# Patient Record
Sex: FEMALE | Race: WHITE | HISPANIC OR LATINO | Employment: FULL TIME | URBAN - METROPOLITAN AREA
[De-identification: names, ages, dates, MRNs, and addresses within clinical notes are randomized per-mention and may not be internally consistent; named-entity substitution may affect disease eponyms.]

---

## 2017-02-23 ENCOUNTER — TELEPHONE (OUTPATIENT)
Dept: FAMILY MEDICINE | Facility: CLINIC | Age: 42
End: 2017-02-23

## 2017-02-23 ENCOUNTER — OFFICE VISIT (OUTPATIENT)
Dept: FAMILY MEDICINE | Facility: CLINIC | Age: 42
End: 2017-02-23
Payer: COMMERCIAL

## 2017-02-23 VITALS
BODY MASS INDEX: 24.29 KG/M2 | SYSTOLIC BLOOD PRESSURE: 117 MMHG | TEMPERATURE: 96.2 F | OXYGEN SATURATION: 100 % | WEIGHT: 132 LBS | HEART RATE: 68 BPM | HEIGHT: 62 IN | DIASTOLIC BLOOD PRESSURE: 72 MMHG

## 2017-02-23 DIAGNOSIS — R07.89 OTHER CHEST PAIN: Primary | ICD-10-CM

## 2017-02-23 PROCEDURE — 93000 ELECTROCARDIOGRAM COMPLETE: CPT | Performed by: PHYSICIAN ASSISTANT

## 2017-02-23 PROCEDURE — 99214 OFFICE O/P EST MOD 30 MIN: CPT | Performed by: PHYSICIAN ASSISTANT

## 2017-02-23 ASSESSMENT — PAIN SCALES - GENERAL: PAINLEVEL: MODERATE PAIN (4)

## 2017-02-23 NOTE — TELEPHONE ENCOUNTER
Paula Carney is a 41 year old female who calls with chest pain.     PRESENTING PROBLEM:  Chest pain    NURSING ASSESSMENT:  Patient complains of chest pain.  Onset:  Yesterday morning.  Located left chest   Duration constant.  Associated symptoms none.  Allergies:   Allergies   Allergen Reactions     Seasonal Allergies        NURSING PLAN: Nursing advice to patient Go to ER now for evaluation    RECOMMENDED DISPOSITION:  To ED now.  Will comply with recommendation: Yes  If further questions/concerns or if symptoms do not improve, worsen or new symptoms develop, call your PCP or [facility] as soon as possible.      Guideline used:  Telephone Triage Protocols for Nurses, Fifth Edition, Bertha Bone RN

## 2017-02-23 NOTE — NURSING NOTE
"Chief Complaint   Patient presents with     Chest Pain     started yesterday morning, consistent on upper left chest area.       Initial /72 (BP Location: Right arm, Patient Position: Chair, Cuff Size: Adult Regular)  Pulse 68  Temp 96.2  F (35.7  C)  Ht 5' 2\" (1.575 m)  Wt 132 lb (59.9 kg)  BMI 24.14 kg/m2 Estimated body mass index is 24.14 kg/(m^2) as calculated from the following:    Height as of this encounter: 5' 2\" (1.575 m).    Weight as of this encounter: 132 lb (59.9 kg).  Medication Reconciliation: complete  Yue Bone RN    "

## 2017-02-23 NOTE — TELEPHONE ENCOUNTER
Patient is calling with chest pains, stating not at the moment but have been having it all day and all night. In the middle of triage to RN, pt hung up. Please call. Thank you.

## 2017-02-23 NOTE — MR AVS SNAPSHOT
After Visit Summary   2/23/2017    Paula Carney    MRN: [id number]           Patient Information     Date Of Birth          1975        Visit Information        Provider Department      2/23/2017 12:50 PM Tc Knight PA-C [facility]        Today's Diagnoses     Chest pain    -  1      Care Instructions    ice or cold packs 20 minutes every 2-3 hrs as needed to relieve pain and swelling, for the first 2 days. Then can apply heat 20 minutes every 2-3 hrs (avoid sleeping on heating pad) there after as needed.   If able to tolerate non-steroidal anti-inflammatory medication like: Ibuprofen 600-800 mg three times daily or Aleve 200-400 mg twice daily.   Active range of motion exercises encouraged  Activity modification trying to avoid activities that cause you pain.   Follow up 2-4 wks as needed     Tc Knight PA-C                  Costochondritis (Chest Wall Pain)  Information About Your Condition:  Description  Costochondritis is inflammation of the joint between a rib and the breastbone (sternum) or between the bony part of the rib and the rib cartilage. Cartilage is a tough rubbery tissue that lines and cushions the surfaces of joints. The pain is most often on the left side of your chest, but can be on either side. Your healthcare provider might refer to costochondritis by other names, including chest wall pain, costosternal syndrome and costosternal chondrodynia. When the pain of costochondritis is accompanied by swelling it is called Tietze's syndrome. Costochondritis is more common in women than men. It tends to occur more often in people 12 to 14 years old or over 40 years old.   Symptoms  pain or tenderness to touch in the front of the chest near the breastbone   sharp pain when taking a deep breath, coughing, moving a certain way, or when pressing on it   trouble taking a deep breath   Causes  Sometimes costochondritis is caused by an injury to the chest,  such as falling or getting hit by something in the chest. It can also be caused by an infection, such as a cold or the flu. Many times the cause cannot be found.   What You Should Do At Home (Follow-up Care)   Avoid activities or movement that makes the pain worse.   Sometimes heat makes the pain better. A heating pad on the lowest setting can be put on the area for 20 minutes 4 to 8 times a day.   When the pain is gone, go back to your normal activities slowly.   Do gentle stretching exercises, but do not do vigorous exercise.   Acetaminophen (Tylenol ) or over-the-counter anti-inflammatory medicine such as ibuprofen (Motrin , Advil ) or naproxen (Aleve , Naprosyn ) may help decrease your pain. You should not take ibuprofen or naproxen if you have a history of bleeding in your stomach.   If you were given a prescription, be sure to get it filled right away. Take the medicine exactly as prescribed. If you do not think it is helping, call your healthcare provider. Do not increase how much you take or how often you take it without talking to your healthcare provider first.   If the healthcare provider prescribes pain medicine that makes you tired, or sleepy, or contains narcotics, you should not drink alcohol, including beer and wine, drive, or participate in any other activities that you need to be clear-headed for.   Please keep all medicines out of the reach of children.   What You Can Do Stay Healthy  Be sure to stretch and warm up properly before you start any strenuous exercise or activity.   Care Alerts  Call Your Healthcare Provider Right Away Or Return To The Emergency Department If:  You have a fever higher than 101.5  F (38.6  C) orally.   You start to have a cough with yellowish or greenish phlegm (mucus.)   There are streaks of blood in the phlegm you are coughing up.   You have any symptoms that worry you.             Follow-ups after your visit        Your next 10 appointments already scheduled     Feb  "2017 12:50 PM CST   [facility]    [address] [facility] [address] [phone]              Who to contact     If you have questions or need follow up information about today's clinic visit or your schedule please contact [facility] directly at [phone].  Normal or non-critical lab and imaging results will be communicated to you by CAPPTUREhart, letter or phone within 4 business days after the clinic has received the results. If you do not hear from us within 7 days, please contact the clinic through [facility] or phone. If you have a critical or abnormal lab result, we will notify you by phone as soon as possible.  Submit refill requests through Crunched or call your pharmacy and they will forward the refill request to us. Please allow 3 business days for your refill to be completed.          Additional Information About Your Visit        Crunched Information     Crunched lets you send messages to your doctor, view your test results, renew your prescriptions, schedule appointments and more. To sign up, go to [email or web address] . Click on \"Log in\" on the left side of the screen, which will take you to the Welcome page. Then click on \"Sign up Now\" on the right side of the page.     You will be asked to enter the access code listed below, as well as some personal information. Please follow the directions to create your username and password.     Your access code is: [email or web address]  Expires: 2017 12:41 PM     Your access code will  in 90 days. If you need help or a new code, please call your [facility] or [phone].        Care EveryWhere ID     This is your Care EveryWhere ID. This could be used by other organizations to access your [place] medical records  [id number]        Your Vitals Were     Pulse Temperature Height Pulse Oximetry BMI (Body Mass Index)       68 96.2  F (35.7  C) 5' 2\" " "(1.575 m) 100% 24.14 kg/m2        Blood Pressure from Last 3 Encounters:   02/23/17 117/72   09/02/16 106/68   07/18/16 90/68    Weight from Last 3 Encounters:   02/23/17 132 lb (59.9 kg)   09/02/16 130 lb (59 kg)   07/18/16 133 lb (60.3 kg)              We Performed the Following     EKG 12-lead complete w/read - Clinics        Primary Care Provider Office Phone # Fax #    Alida Martinez PA-C [phone]       [facility] [address] [facility] [address]        Thank you!     Thank you for choosing [facility]  for your care. Our goal is always to provide you with excellent care. Hearing back from our patients is one way we can continue to improve our services. Please take a few minutes to complete the written survey that you may receive in the mail after your visit with us. Thank you!             Your Updated Medication List - Protect others around you: Learn how to safely use, store and throw away your medicines at www.disposemymeds.org.          This list is accurate as of: 2/23/17 12:41 PM.  Always use your most recent med list.                   Brand Name Dispense Instructions for use    polyethylene glycol 400 0.25 % Soln ophthalmic solution    BLINK TEARS     Place 1 drop into both eyes 2 times daily \"Blink Tears\"         "

## 2017-02-23 NOTE — PROGRESS NOTES
SUBJECTIVE:                                                    Paula Carney is a 41 year old female who presents to clinic today for the following health issues:    Chest Pain      Onset: 3 days    Description (location/character/radiation/duration): left chest.     Intensity:  mild    Accompanying signs and symptoms:        Shortness of breath: no        Sweating: no        Nausea/vomitting: no        Palpitations: no        Other (fevers/chills/cough/heartburn/lightheadedness): no     History (similar episodes/previous evaluation): None    Precipitating or alleviating factors:       Worse with exertion: no        Worse with breathing: no        Related to eating: no        Better with burping: no     Therapies tried and outcome: None   She says it is painful when she pushes on chest and that reproduces her pain. Feels better with stretching arm back. No injury no new activities. No exertional symptoms. No numbness/tingling.  No history or family history of cardiac dz.     Problem list and histories reviewed & adjusted, as indicated.  Additional history: as documented    Patient Active Problem List   Diagnosis     CARDIOVASCULAR SCREENING; LDL GOAL LESS THAN 160     Ptosis, congenital, right     Chondromalacia patellae     Iliotibial band syndrome     Vitamin D deficiency     ASCUS with positive high risk HPV     Past Surgical History   Procedure Laterality Date     Mammoplasty augmentation         Social History   Substance Use Topics     Smoking status: Never Smoker     Smokeless tobacco: Never Used      Comment: Lives in smoke free household     Alcohol use No     Family History   Problem Relation Age of Onset     Asthma No family hx of      C.A.D. No family hx of      DIABETES No family hx of      Hypertension No family hx of      CEREBROVASCULAR DISEASE No family hx of      Breast Cancer No family hx of      Cancer - colorectal No family hx of      Anesthesia Reaction No family hx of      CANCER No  "family hx of      Thyroid Disease No family hx of      Glaucoma No family hx of      Macular Degeneration No family hx of          Current Outpatient Prescriptions   Medication Sig Dispense Refill     polyethylene glycol 400 (BLINK TEARS) 0.25 % SOLN ophthalmic solution Place 1 drop into both eyes 2 times daily \"Blink Tears\" (Patient not taking: Reported on 2/23/2017)       Allergies   Allergen Reactions     Seasonal Allergies      Problem list, Medication list, Allergies, and Medical/Social/Surgical histories reviewed in [facility] and updated as appropriate.    ROS:  Constitutional, HEENT, cardiovascular, pulmonary, gi and gu systems are negative, except as otherwise noted.    OBJECTIVE:                                                    /72 (BP Location: Right arm, Patient Position: Chair, Cuff Size: Adult Regular)  Pulse 68  Temp 96.2  F (35.7  C)  Ht 5' 2\" (1.575 m)  Wt 132 lb (59.9 kg)  SpO2 100%  BMI 24.14 kg/m2  Body mass index is 24.14 kg/(m^2).  GENERAL: healthy, alert and no distress  EYES: Eyes grossly normal to inspection, PERRL and conjunctivae and sclerae normal  HENT: ear canals and TM's normal, nose and mouth without ulcers or lesions  NECK: no adenopathy, no asymmetry, masses, or scars and thyroid normal to palpation  RESP: lungs clear to auscultation - no rales, rhonchi or wheezes  CV: regular rate and rhythm, normal S1 S2, no S3 or S4, no murmur, click or rub, no peripheral edema and peripheral pulses strong  Chest: point tender left supper middle rib region which reproduces her symptoms.   ABDOMEN: soft, nontender, no hepatosplenomegaly, no masses and bowel sounds normal  MS: no gross musculoskeletal defects noted, no edema  SKIN: no suspicious lesions or rashes  NEURO: Normal strength and tone, mentation intact and speech normal  PSYCH: mentation appears normal, affect normal/bright    Diagnostic Test Results:  EKG - NSR. None to compare     ASSESSMENT/PLAN:                                      "                   ICD-10-CM    1. Other chest pain R07.89 EKG 12-lead complete w/read - Clinics   See Patient Instructions  Patient reassurance that symptoms are musculoskeletal and expected healing course.  warning signs discussed. side effects discussed   follow up  As needed     Tc Knight PA-C  [facility]

## 2017-02-23 NOTE — PATIENT INSTRUCTIONS
ice or cold packs 20 minutes every 2-3 hrs as needed to relieve pain and swelling, for the first 2 days. Then can apply heat 20 minutes every 2-3 hrs (avoid sleeping on heating pad) there after as needed.   If able to tolerate non-steroidal anti-inflammatory medication like: Ibuprofen 600-800 mg three times daily or Aleve 200-400 mg twice daily.   Active range of motion exercises encouraged  Activity modification trying to avoid activities that cause you pain.   Follow up 2-4 wks as needed     Tc Knight PA-C                  Costochondritis (Chest Wall Pain)  Information About Your Condition:  Description  Costochondritis is inflammation of the joint between a rib and the breastbone (sternum) or between the bony part of the rib and the rib cartilage. Cartilage is a tough rubbery tissue that lines and cushions the surfaces of joints. The pain is most often on the left side of your chest, but can be on either side. Your healthcare provider might refer to costochondritis by other names, including chest wall pain, costosternal syndrome and costosternal chondrodynia. When the pain of costochondritis is accompanied by swelling it is called Tietze's syndrome. Costochondritis is more common in women than men. It tends to occur more often in people 12 to 14 years old or over 40 years old.   Symptoms  pain or tenderness to touch in the front of the chest near the breastbone   sharp pain when taking a deep breath, coughing, moving a certain way, or when pressing on it   trouble taking a deep breath   Causes  Sometimes costochondritis is caused by an injury to the chest, such as falling or getting hit by something in the chest. It can also be caused by an infection, such as a cold or the flu. Many times the cause cannot be found.   What You Should Do At Home (Follow-up Care)   Avoid activities or movement that makes the pain worse.   Sometimes heat makes the pain better. A heating pad on the lowest setting can be put on the  area for 20 minutes 4 to 8 times a day.   When the pain is gone, go back to your normal activities slowly.   Do gentle stretching exercises, but do not do vigorous exercise.   Acetaminophen (Tylenol ) or over-the-counter anti-inflammatory medicine such as ibuprofen (Motrin , Advil ) or naproxen (Aleve , Naprosyn ) may help decrease your pain. You should not take ibuprofen or naproxen if you have a history of bleeding in your stomach.   If you were given a prescription, be sure to get it filled right away. Take the medicine exactly as prescribed. If you do not think it is helping, call your healthcare provider. Do not increase how much you take or how often you take it without talking to your healthcare provider first.   If the healthcare provider prescribes pain medicine that makes you tired, or sleepy, or contains narcotics, you should not drink alcohol, including beer and wine, drive, or participate in any other activities that you need to be clear-headed for.   Please keep all medicines out of the reach of children.   What You Can Do Stay Healthy  Be sure to stretch and warm up properly before you start any strenuous exercise or activity.   Care Alerts  Call Your Healthcare Provider Right Away Or Return To The Emergency Department If:  You have a fever higher than 101.5  F (38.6  C) orally.   You start to have a cough with yellowish or greenish phlegm (mucus.)   There are streaks of blood in the phlegm you are coughing up.   You have any symptoms that worry you.

## 2017-04-26 ENCOUNTER — TELEPHONE (OUTPATIENT)
Dept: FAMILY MEDICINE | Facility: CLINIC | Age: 42
End: 2017-04-26

## 2017-04-26 NOTE — TELEPHONE ENCOUNTER
Patient is calling because she says she needs to schedule a Colposcopy. Please call to advise. Thank you

## 2017-05-15 NOTE — PROGRESS NOTES
SUBJECTIVE:                                                    Paula Carney is a 41 year old female who presents to clinic today for the following health issues:  Multiple health concerns:        Sore skin     Onset: last week not now    Description:   Location: Both legs  Character: painful, burning  Itching (Pruritis): no     Progression of Symptoms:  same    Accompanying Signs & Symptoms:  Fever: no   Body aches or joint pain: noSore throat symptoms: no   Recent cold symptoms: no    History:   Previous similar rash: no     Precipitating factors:   Exposure to similar rash: no   New exposures: None   Recent travel: no     Alleviating factors:  None     Therapies Tried and outcome: none    Last week her right thigh skin hurt, then left leg did the next day, went away quickly. Ibuprofen made it go away but then she had the same symptoms on right lower leg on a different day.   Got to the point where she was limping due to pain on her foot. Today it feels normal and she has no symptoms. Never had before. No numbness or tingling just felt sore.  Hurts to touch, felt some burning. No rashes. No low back pain.  No varicose veins. She admits she googled it and is worried about diabetes causing it. No edema or erythema. No history of diabetes.   H/o IT band syndrome and chondromalacia patella.       2)    irregular periods for a long time about 4 years, no period x 3 month now but she has gone 4 months without a periods before.  Also reports some mild dizziness off and on ( usually with working out and orthostatic/with burpees) and lack of appetite. But this got better with drinking fluids/more water.    no recent labs have been done on her.     has vasectomy so they dont need oral contraceptive pill.   Has had hormone testing 4 years ago, was normal per patient.     H/o abnormal pap:   Scheduled already  for colp this year with Dr. Angulo.   stephen in 2016 with high risk HPV.     3)   Upset stomach:    Eats about twice daily only---. Eats a lot of protein. Will try to eat smaller meals more frequently.   Only gets heartburn if she drinks coffee.  Doesn't usually feel that hungry per patient. No weight concerns per patient.   Does think she is lactose intolerant. Her symptoms are worse if she has creamer in her coffee.  No diarrhea.   Gets gassy with milk intake.  No vomiting.   H/o heartburn, she does not remember taking omeprazole but she has in the past.   No blood in stools or darker stools.     Admits she only Gets about 4 hours of sleep regularly--works a lot (2 jobs). Is tired from that. Is trying to get more sleep and not staying up so late.   Has been more stressed lately also.   Denies suicidal or homicidal thoughts.  Patient instructed to go to the emergency room or call 911 if these occur.    Nonsmoker.     Not sure if FH of thyroid issues. She would like her hormones and iron levels checked today. She does take iron over-the-counter daily.        Problem list and histories reviewed & adjusted, as indicated.  Additional history: as documented    Patient Active Problem List   Diagnosis     CARDIOVASCULAR SCREENING; LDL GOAL LESS THAN 160     Ptosis, congenital, right     Chondromalacia patellae     Iliotibial band syndrome     Vitamin D deficiency     ASCUS with positive high risk HPV     Past Surgical History:   Procedure Laterality Date     MAMMOPLASTY AUGMENTATION         Social History   Substance Use Topics     Smoking status: Never Smoker     Smokeless tobacco: Never Used      Comment: Lives in smoke free household     Alcohol use No     Family History   Problem Relation Age of Onset     Asthma No family hx of      C.A.D. No family hx of      DIABETES No family hx of      Hypertension No family hx of      CEREBROVASCULAR DISEASE No family hx of      Breast Cancer No family hx of      Cancer - colorectal No family hx of      Anesthesia Reaction No family hx of      CANCER No family hx of       "Thyroid Disease No family hx of      Glaucoma No family hx of      Macular Degeneration No family hx of          Current Outpatient Prescriptions   Medication Sig Dispense Refill     omeprazole (PRILOSEC) 40 MG capsule Take 1 capsule (40 mg) by mouth daily Take 30-60 minutes before a meal. 30 capsule 1     polyethylene glycol 400 (BLINK TEARS) 0.25 % SOLN ophthalmic solution Place 1 drop into both eyes 2 times daily \"Blink Tears\"       Allergies   Allergen Reactions     Seasonal Allergies        ROS:  Constitutional, HEENT, cardiovascular, pulmonary, GI, , musculoskeletal, neuro, skin, endocrine and psych systems are negative, except as otherwise noted.    OBJECTIVE:                                                    /76  Pulse 66  Temp 97.7  F (36.5  C) (Oral)  Wt 134 lb (60.8 kg)  SpO2 100%  Breastfeeding? No  BMI 24.51 kg/m2  Body mass index is 24.51 kg/(m^2).  GENERAL: healthy, alert and no distress  EYES: Eyes grossly normal to inspection, PERRL and conjunctivae and sclerae normal  HENT: ear canals and TM's normal, nose and mouth without ulcers or lesions  NECK: no adenopathy, no asymmetry, masses, or scars and thyroid normal to palpation  RESP: lungs clear to auscultation - no rales, rhonchi or wheezes  CV: regular rate and rhythm, normal S1 S2, no S3 or S4, no murmur, click or rub, no peripheral edema and peripheral pulses strong  ABDOMEN: soft, nontender, no hepatosplenomegaly, no masses and bowel sounds normal  MS: no gross musculoskeletal defects noted, no edema  NEURO: Normal strength and tone, mentation intact and speech normal  PSYCH: mentation appears normal, affect normal/bright  Skin: NO rashes.   LEGS: NO EDEMA OR Varicose veins, no tenderness    Results for orders placed or performed in visit on 05/18/17 (from the past 24 hour(s))   CBC with platelets differential   Result Value Ref Range    WBC 5.9 4.0 - 11.0 10e9/L    RBC Count 4.95 3.8 - 5.2 10e12/L    Hemoglobin 13.4 11.7 - 15.7 " g/dL    Hematocrit 40.3 35.0 - 47.0 %    MCV 81 78 - 100 fl    MCH 27.1 26.5 - 33.0 pg    MCHC 33.3 31.5 - 36.5 g/dL    RDW 13.2 10.0 - 15.0 %    Platelet Count 245 150 - 450 10e9/L    Diff Method Automated Method     % Neutrophils 46.0 %    % Lymphocytes 45.8 %    % Monocytes 6.8 %    % Eosinophils 1.2 %    % Basophils 0.2 %    Absolute Neutrophil 2.7 1.6 - 8.3 10e9/L    Absolute Lymphocytes 2.7 0.8 - 5.3 10e9/L    Absolute Monocytes 0.4 0.0 - 1.3 10e9/L    Absolute Eosinophils 0.1 0.0 - 0.7 10e9/L    Absolute Basophils 0.0 0.0 - 0.2 10e9/L          ASSESSMENT/PLAN:                                                    ASSESSMENT / PLAN:  (N92.6) Irregular menstrual cycle  (primary encounter diagnosis)  Comment: for years, see below. Suspect related to anxiety/stress and she does exercise a lot, but could be early menopause or PCOS or something else  Plan: Follicle stimulating hormone, TSH with free T4         reflex, Estradiol, HCG quantitative pregnancy,         Progesterone, Testosterone Free and Total            (K30) Acid indigestion  Comment:   Plan: omeprazole (PRILOSEC) 40 MG capsule        See below    (R20.9) Disturbance of skin sensation  Comment:   Plan: Vitamin B12, CBC with platelets differential,         Comprehensive metabolic panel, Ferritin,         Folate, Iron and iron binding capacity          Does not sound concerning per history was brief, if all labs normal will monitor       (E55.9) Vitamin D deficiency  Comment:   Plan: Vitamin D Deficiency            Patient Instructions   I will follow up with labs and plan from there    Try to get more sleep if possible    Stay hydrated    Try omeprazole prescription for at least 2 weeks, if not helping your tummy symptoms let me know and we will consider an upper endoscopy    If needed we will have you see OBGYN for your periods but I will check labs first          Alida Martinez PA-C  [facility]

## 2017-05-18 ENCOUNTER — OFFICE VISIT (OUTPATIENT)
Dept: FAMILY MEDICINE | Facility: CLINIC | Age: 42
End: 2017-05-18
Payer: COMMERCIAL

## 2017-05-18 VITALS
SYSTOLIC BLOOD PRESSURE: 110 MMHG | BODY MASS INDEX: 24.51 KG/M2 | TEMPERATURE: 97.7 F | HEART RATE: 66 BPM | OXYGEN SATURATION: 100 % | WEIGHT: 134 LBS | DIASTOLIC BLOOD PRESSURE: 76 MMHG

## 2017-05-18 DIAGNOSIS — E55.9 VITAMIN D DEFICIENCY: ICD-10-CM

## 2017-05-18 DIAGNOSIS — K30 ACID INDIGESTION: ICD-10-CM

## 2017-05-18 DIAGNOSIS — R20.9 DISTURBANCE OF SKIN SENSATION: ICD-10-CM

## 2017-05-18 DIAGNOSIS — N92.6 IRREGULAR MENSTRUAL CYCLE: Primary | ICD-10-CM

## 2017-05-18 LAB
ALBUMIN SERPL-MCNC: 3.8 G/DL (ref 3.4–5)
ALP SERPL-CCNC: 99 U/L (ref 40–150)
ALT SERPL W P-5'-P-CCNC: 24 U/L (ref 0–50)
ANION GAP SERPL CALCULATED.3IONS-SCNC: 8 MMOL/L (ref 3–14)
AST SERPL W P-5'-P-CCNC: 12 U/L (ref 0–45)
B-HCG SERPL-ACNC: 1 IU/L (ref 0–5)
BASOPHILS # BLD AUTO: 0 10E9/L (ref 0–0.2)
BASOPHILS NFR BLD AUTO: 0.2 %
BILIRUB SERPL-MCNC: 0.2 MG/DL (ref 0.2–1.3)
BUN SERPL-MCNC: 13 MG/DL (ref 7–30)
CALCIUM SERPL-MCNC: 9.1 MG/DL (ref 8.5–10.1)
CHLORIDE SERPL-SCNC: 103 MMOL/L (ref 94–109)
CO2 SERPL-SCNC: 29 MMOL/L (ref 20–32)
CREAT SERPL-MCNC: 0.67 MG/DL (ref 0.52–1.04)
DIFFERENTIAL METHOD BLD: NORMAL
EOSINOPHIL # BLD AUTO: 0.1 10E9/L (ref 0–0.7)
EOSINOPHIL NFR BLD AUTO: 1.2 %
ERYTHROCYTE [DISTWIDTH] IN BLOOD BY AUTOMATED COUNT: 13.2 % (ref 10–15)
ESTRADIOL SERPL-MCNC: 79 PG/ML
FERRITIN SERPL-MCNC: 60 NG/ML (ref 12–150)
FOLATE SERPL-MCNC: 14.2 NG/ML
FSH SERPL-ACNC: 51.7 IU/L
GFR SERPL CREATININE-BSD FRML MDRD: NORMAL ML/MIN/1.7M2
GLUCOSE SERPL-MCNC: 92 MG/DL (ref 70–99)
HCT VFR BLD AUTO: 40.3 % (ref 35–47)
HGB BLD-MCNC: 13.4 G/DL (ref 11.7–15.7)
IRON SATN MFR SERPL: 23 % (ref 15–46)
IRON SERPL-MCNC: 73 UG/DL (ref 35–180)
LYMPHOCYTES # BLD AUTO: 2.7 10E9/L (ref 0.8–5.3)
LYMPHOCYTES NFR BLD AUTO: 45.8 %
MCH RBC QN AUTO: 27.1 PG (ref 26.5–33)
MCHC RBC AUTO-ENTMCNC: 33.3 G/DL (ref 31.5–36.5)
MCV RBC AUTO: 81 FL (ref 78–100)
MONOCYTES # BLD AUTO: 0.4 10E9/L (ref 0–1.3)
MONOCYTES NFR BLD AUTO: 6.8 %
NEUTROPHILS # BLD AUTO: 2.7 10E9/L (ref 1.6–8.3)
NEUTROPHILS NFR BLD AUTO: 46 %
PLATELET # BLD AUTO: 245 10E9/L (ref 150–450)
POTASSIUM SERPL-SCNC: 3.9 MMOL/L (ref 3.4–5.3)
PROGEST SERPL-MCNC: NORMAL NG/ML
PROT SERPL-MCNC: 7.6 G/DL (ref 6.8–8.8)
RBC # BLD AUTO: 4.95 10E12/L (ref 3.8–5.2)
SODIUM SERPL-SCNC: 140 MMOL/L (ref 133–144)
TIBC SERPL-MCNC: 311 UG/DL (ref 240–430)
TSH SERPL DL<=0.005 MIU/L-ACNC: 2.03 MU/L (ref 0.4–4)
VIT B12 SERPL-MCNC: 385 PG/ML (ref 193–986)
WBC # BLD AUTO: 5.9 10E9/L (ref 4–11)

## 2017-05-18 PROCEDURE — 83001 ASSAY OF GONADOTROPIN (FSH): CPT | Performed by: PHYSICIAN ASSISTANT

## 2017-05-18 PROCEDURE — 84702 CHORIONIC GONADOTROPIN TEST: CPT | Performed by: PHYSICIAN ASSISTANT

## 2017-05-18 PROCEDURE — 83540 ASSAY OF IRON: CPT | Performed by: PHYSICIAN ASSISTANT

## 2017-05-18 PROCEDURE — 82728 ASSAY OF FERRITIN: CPT | Performed by: PHYSICIAN ASSISTANT

## 2017-05-18 PROCEDURE — 82746 ASSAY OF FOLIC ACID SERUM: CPT | Performed by: PHYSICIAN ASSISTANT

## 2017-05-18 PROCEDURE — 83550 IRON BINDING TEST: CPT | Performed by: PHYSICIAN ASSISTANT

## 2017-05-18 PROCEDURE — 84403 ASSAY OF TOTAL TESTOSTERONE: CPT | Performed by: PHYSICIAN ASSISTANT

## 2017-05-18 PROCEDURE — 82607 VITAMIN B-12: CPT | Performed by: PHYSICIAN ASSISTANT

## 2017-05-18 PROCEDURE — 99214 OFFICE O/P EST MOD 30 MIN: CPT | Performed by: PHYSICIAN ASSISTANT

## 2017-05-18 PROCEDURE — 84144 ASSAY OF PROGESTERONE: CPT | Performed by: PHYSICIAN ASSISTANT

## 2017-05-18 PROCEDURE — 80050 GENERAL HEALTH PANEL: CPT | Performed by: PHYSICIAN ASSISTANT

## 2017-05-18 PROCEDURE — 84270 ASSAY OF SEX HORMONE GLOBUL: CPT | Performed by: PHYSICIAN ASSISTANT

## 2017-05-18 PROCEDURE — 82306 VITAMIN D 25 HYDROXY: CPT | Performed by: PHYSICIAN ASSISTANT

## 2017-05-18 PROCEDURE — 36415 COLL VENOUS BLD VENIPUNCTURE: CPT | Performed by: PHYSICIAN ASSISTANT

## 2017-05-18 PROCEDURE — 82670 ASSAY OF TOTAL ESTRADIOL: CPT | Performed by: PHYSICIAN ASSISTANT

## 2017-05-18 RX ORDER — OMEPRAZOLE 40 MG/1
40 CAPSULE, DELAYED RELEASE ORAL DAILY
Qty: 30 CAPSULE | Refills: 1 | Status: SHIPPED | OUTPATIENT
Start: 2017-05-18 | End: 2018-01-10

## 2017-05-18 NOTE — NURSING NOTE
"Chief Complaint   Patient presents with     Derm Problem     sore skin on legs area per pt x 4 days no known injury       Initial /76  Pulse 66  Temp 97.7  F (36.5  C) (Oral)  Wt 134 lb (60.8 kg)  SpO2 100%  Breastfeeding? No  BMI 24.51 kg/m2 Estimated body mass index is 24.51 kg/(m^2) as calculated from the following:    Height as of 2/23/17: 5' 2\" (1.575 m).    Weight as of this encounter: 134 lb (60.8 kg).  Medication Reconciliation: complete      Lashanda Pyle MA    "

## 2017-05-18 NOTE — MR AVS SNAPSHOT
After Visit Summary   5/18/2017    Paula Carney    MRN: [id number]           Patient Information     Date Of Birth          1975        Visit Information        Provider Department      5/18/2017 3:40 PM Alida Martinez PA-C [facility]        Today's Diagnoses     Irregular menstrual cycle    -  1    Acid indigestion        Disturbance of skin sensation        Vitamin D deficiency          Care Instructions    I will follow up with labs and plan from there    Try to get more sleep if possible    Stay hydrated    Try omeprazole prescription for at least 2 weeks, if not helping your tummy symptoms let me know and we will consider an upper endoscopy    If needed we will have you see OBGYN for your periods but I will check labs first            Follow-ups after your visit        Your next 10 appointments already scheduled     Jun 05, 2017  3:15 PM CDT   PROCEDURE with Sheela Angulo MD, [place] PROCEDURE ROOM 2   [facility]    [address] [address] [facility] [facility] [address] [phone]              Who to contact     If you have questions or need follow up information about today's clinic visit or your schedule please contact [facility] directly at [phone].  Normal or non-critical lab and imaging results will be communicated to you by MyChart, letter or phone within 4 business days after the clinic has received the results. If you do not hear from us within 7 days, please contact the clinic through MyChart or phone. If you have a critical or abnormal lab result, we will notify you by phone as soon as possible.  Submit refill requests through [employer] or call your pharmacy and they will forward the refill request to us. Please allow 3 business days for your refill to be completed.          Additional Information About Your Visit        Care EveryWhere ID     This is your Care EveryWhere ID. This could be  used by other organizations to access your [facility] medical records  [id number]        Your Vitals Were     Pulse Temperature Pulse Oximetry Breastfeeding? BMI (Body Mass Index)       66 97.7  F (36.5  C) (Oral) 100% No 24.51 kg/m2        Blood Pressure from Last 3 Encounters:   05/18/17 110/76   02/23/17 117/72   09/02/16 106/68    Weight from Last 3 Encounters:   05/18/17 134 lb (60.8 kg)   02/23/17 132 lb (59.9 kg)   09/02/16 130 lb (59 kg)              We Performed the Following     CBC with platelets differential     Comprehensive metabolic panel     Estradiol     Ferritin     Folate     Follicle stimulating hormone     HCG quantitative pregnancy     Iron and iron binding capacity     Progesterone     Testosterone Free and Total     TSH with free T4 reflex     Vitamin B12     Vitamin D Deficiency          Today's Medication Changes          These changes are accurate as of: 5/18/17  4:27 PM.  If you have any questions, ask your nurse or doctor.               Start taking these medicines.        Dose/Directions    omeprazole 40 MG capsule   Commonly known as:  priLOSEC   Used for:  Acid indigestion   Started by:  Alida Martinez PA-C        Dose:  40 mg   Take 1 capsule (40 mg) by mouth daily Take 30-60 minutes before a meal.   Quantity:  30 capsule   Refills:  1            Where to get your medicines      These medications were sent to [id number] [address]  [address] [address]    Hours:  24-hours Phone:  [phone]     omeprazole 40 MG capsule                Primary Care Provider Office Phone # Fax #    Alida Martinez PA-C [phone]       [facility] [address] [facility] [address]        Thank you!     Thank you for choosing [facility]  for your care. Our goal is always to provide you with excellent care. Hearing back from our patients  "is one way we can continue to improve our services. Please take a few minutes to complete the written survey that you may receive in the mail after your visit with us. Thank you!             Your Updated Medication List - Protect others around you: Learn how to safely use, store and throw away your medicines at www.disposemymeds.org.          This list is accurate as of: 5/18/17  4:27 PM.  Always use your most recent med list.                   Brand Name Dispense Instructions for use    omeprazole 40 MG capsule    priLOSEC    30 capsule    Take 1 capsule (40 mg) by mouth daily Take 30-60 minutes before a meal.       polyethylene glycol 400 0.25 % Soln ophthalmic solution    BLINK TEARS     Place 1 drop into both eyes 2 times daily \"Blink Tears\"         "

## 2017-05-18 NOTE — PATIENT INSTRUCTIONS
I will follow up with labs and plan from there    Try to get more sleep if possible    Stay hydrated    Try omeprazole prescription for at least 2 weeks, if not helping your tummy symptoms let me know and we will consider an upper endoscopy    If needed we will have you see OBGYN for your periods but I will check labs first

## 2017-05-19 LAB — DEPRECATED CALCIDIOL+CALCIFEROL SERPL-MC: 31 UG/L (ref 20–75)

## 2017-05-22 LAB
SHBG SERPL-SCNC: 96 NMOL/L (ref 30–135)
TESTOST FREE SERPL-MCNC: 0.1 NG/DL (ref 0.11–0.58)
TESTOST SERPL-MCNC: 15 NG/DL (ref 8–60)

## 2017-05-22 NOTE — PROGRESS NOTES
PLEASE CALL PATIENT: Dear Paula,      It was a pleasure to see you at your recent office visit.  Your test results are listed below. Labs normal. Nothing to explain irregular periods.  Please schedule with OBGYN to help address this further.  It could be stress related also.     Hormones are all normal. Vitamins normal. Iron normal.   Thyroid normal. White and red blood cell counts normal.  No anemia.  Sodium, potassium, kidney and liver function normal.  Blood sugar normal, no diabetes.            If you have any questions or concerns, please call the clinic at [phone].    Sincerely,  Alida Martinez PA-C

## 2017-05-23 ENCOUNTER — TELEPHONE (OUTPATIENT)
Dept: FAMILY MEDICINE | Facility: CLINIC | Age: 42
End: 2017-05-23

## 2017-05-23 NOTE — TELEPHONE ENCOUNTER
Left message on answering machine for patient to call back.  [phone]  Elizabeth CASTN, RN, CPN

## 2017-05-23 NOTE — TELEPHONE ENCOUNTER
Notes Recorded by Alida Martinez PA-C on 5/22/2017 at 5:16 PM  PLEASE CALL PATIENT: Dear Paula,      It was a pleasure to see you at your recent office visit.  Your test results are listed below. Labs normal. Nothing to explain irregular periods.  Please schedule with OBGYN to help address this further.  It could be stress related also.     Hormones are all normal. Vitamins normal. Iron normal.   Thyroid normal. White and red blood cell counts normal.  No anemia.  Sodium, potassium, kidney and liver function normal.  Blood sugar normal, no diabetes.            If you have any questions or concerns, please call the clinic at [phone].    Sincerely,  Alida Martinez PA-C

## 2017-05-23 NOTE — TELEPHONE ENCOUNTER
Patient informed of result note as below. Patient verbalized understanding..Elizabeth CASTN, RN, CPN

## 2017-05-24 ENCOUNTER — OFFICE VISIT (OUTPATIENT)
Dept: INTERNAL MEDICINE | Facility: CLINIC | Age: 42
End: 2017-05-24
Payer: COMMERCIAL

## 2017-05-24 VITALS
TEMPERATURE: 97.8 F | DIASTOLIC BLOOD PRESSURE: 74 MMHG | OXYGEN SATURATION: 99 % | SYSTOLIC BLOOD PRESSURE: 107 MMHG | WEIGHT: 137.6 LBS | BODY MASS INDEX: 25.17 KG/M2 | HEART RATE: 69 BPM

## 2017-05-24 DIAGNOSIS — R10.12 LUQ ABDOMINAL PAIN: Primary | ICD-10-CM

## 2017-05-24 PROCEDURE — 99213 OFFICE O/P EST LOW 20 MIN: CPT | Performed by: INTERNAL MEDICINE

## 2017-05-24 NOTE — PROGRESS NOTES
SUBJECTIVE:                                                    Paula Carney is a 41 year old female who presents to clinic today for the following health issues:      Left sided abdominal pain/flank pain        Duration: 2 weeks-especially bad today. The pain is sharp, and lasts for hours at time. She had it for 3 hours today.    Description (location/character/radiation): Left side. Comes and goes.    Intensity:  8/10 when breathing or movement. 6/10 when not moving    Accompanying signs and symptoms: a little nausea. Urine has bad odor.    History (similar episodes/previous evaluation): None    Precipitating or alleviating factors: stretch to one side. Push on it and hold it relieves pain    Therapies tried and outcome: None     No noted injury to that area.   No dysuria or urinary frequency or urgency.   No diarrhea.  No constipation-it is formed, of normal consistency, not dark, no blood in the stools.  No recent changes to her diet, though on further introspection, she ate a banana this morning is wondering if this was the  trigger for her pain today.    Wt Readings from Last 4 Encounters:   05/24/17 137 lb 9.6 oz (62.4 kg)   05/18/17 134 lb (60.8 kg)   02/23/17 132 lb (59.9 kg)   09/02/16 130 lb (59 kg)       Problem list and histories reviewed & adjusted, as indicated.  Additional history: as documented    Patient Active Problem List   Diagnosis     CARDIOVASCULAR SCREENING; LDL GOAL LESS THAN 160     Ptosis, congenital, right     Chondromalacia patellae     Iliotibial band syndrome     Vitamin D deficiency     ASCUS with positive high risk HPV     Past Surgical History:   Procedure Laterality Date     MAMMOPLASTY AUGMENTATION         Social History   Substance Use Topics     Smoking status: Never Smoker     Smokeless tobacco: Never Used      Comment: Lives in smoke free household     Alcohol use No     Family History   Problem Relation Age of Onset     Asthma No family hx of      C.A.D. No family  "hx of      DIABETES No family hx of      Hypertension No family hx of      CEREBROVASCULAR DISEASE No family hx of      Breast Cancer No family hx of      Cancer - colorectal No family hx of      Anesthesia Reaction No family hx of      CANCER No family hx of      Thyroid Disease No family hx of      Glaucoma No family hx of      Macular Degeneration No family hx of          Current Outpatient Prescriptions   Medication Sig Dispense Refill     polyethylene glycol 400 (BLINK TEARS) 0.25 % SOLN ophthalmic solution Place 1 drop into both eyes 2 times daily \"Blink Tears\"       omeprazole (PRILOSEC) 40 MG capsule Take 1 capsule (40 mg) by mouth daily Take 30-60 minutes before a meal. (Patient not taking: Reported on 5/24/2017) 30 capsule 1       Reviewed and updated as needed this visit by clinical staff       Reviewed and updated as needed this visit by Provider           ==============================================================  ROS:  Constitutional, HEENT, cardiovascular, pulmonary, GI, , musculoskeletal, neuro, skin, endocrine and psych systems are negative, except as otherwise noted.      OBJECTIVE:                                                    /74 (BP Location: Right arm, Patient Position: Chair, Cuff Size: Adult Regular)  Pulse 69  Temp 97.8  F (36.6  C) (Oral)  Wt 137 lb 9.6 oz (62.4 kg)  SpO2 99%  BMI 25.17 kg/m2  Body mass index is 25.17 kg/(m^2).     GEN: not in acute distress  ABD:  there is a localized, ~3cm by 4cm area of tenderness to deep palpation under the left anterior rib cage, not worse with inspiration on exam; also mildly increased bowel sounds.  Ow soft NT, ND, no appreciated HSM.    ASSESSMENT/PLAN:                                                        ICD-10-CM    1. LUQ abdominal pain R10.12      ASSESSMENT: differential diagnosis: food intolerance vs superificial muscle strain  PLAN:  As per orders above and patient instructions below.     Patient Instructions   Please " try to take acetaminophen: Please take the Extra strength Tylenol or acetaminophen 500mg tablets as 1,000mg every 8 hours (whether you have pain or not at that time) for 1-2 weeks.    Try to use ice or heat packs to the area, whatever makes your pain better.    You can try to avoid bananas and coffee (as today's attack seemed to follow these symptoms).  You have indicated that you may be lactose-intolerant.  You can try to cut back on dairy foods, or try Lactaid.  You can also try keeping an abdominal pain dairy to see which foods and beverages seem to bring out your pain.  Please let us know if your abdominal pain is not any better after 1-2 weeks of the above treatment.               Adri Amado MD  [facility]

## 2017-05-24 NOTE — PATIENT INSTRUCTIONS
Please try to take acetaminophen: Please take the Extra strength Tylenol or acetaminophen 500mg tablets as 1,000mg every 8 hours (whether you have pain or not at that time) for 1-2 weeks.    Try to use ice or heat packs to the area, whatever makes your pain better.    You can try to avoid bananas and coffee (as today's attack seemed to follow these symptoms).  You have indicated that you may be lactose-intolerant.  You can try to cut back on dairy foods, or try Lactaid.  You can also try keeping an abdominal pain dairy to see which foods and beverages seem to bring out your pain.  Please let us know if your abdominal pain is not any better after 1-2 weeks of the above treatment.

## 2017-05-24 NOTE — MR AVS SNAPSHOT
After Visit Summary   5/24/2017    Paula Carney    MRN: [id number]           Patient Information     Date Of Birth          1975        Visit Information        Provider Department      5/24/2017 3:40 PM Adri Amado MD [facility]        Today's Diagnoses     LUQ abdominal pain    -  1      Care Instructions    Please try to take acetaminophen: Please take the Extra strength Tylenol or acetaminophen 500mg tablets as 1,000mg every 8 hours (whether you have pain or not at that time) for 1-2 weeks.    Try to use ice or heat packs to the area, whatever makes your pain better.    You can try to avoid bananas and coffee (as today's attack seemed to follow these symptoms).  You have indicated that you may be lactose-intolerant.  You can try to cut back on dairy foods, or try Lactaid.  You can also try keeping an abdominal pain dairy to see which foods and beverages seem to bring out your pain.  Please let us know if your abdominal pain is not any better after 1-2 weeks of the above treatment.          Follow-ups after your visit        Your next 10 appointments already scheduled     Jun 05, 2017  3:15 PM CDT   PROCEDURE with Sheela Angulo MD, [place] [facility]ED[facility] ROOM 2   [facility]    [address] [address] [phone]              Who to contact     If you have questions or need follow up information about today's clinic visit or your schedule please contact [facility] directly at [phone].  Normal or non-critical lab and imaging results will be communicated to you by MyChart, letter or phone within 4 business days after the clinic has received the results. If you do not hear from us within 7 days, please contact the clinic through MyChart or phone. If you have a critical or abnormal lab result, we will notify you by phone as soon as possible.  Submit refill requests through  "Nickie or call your pharmacy and they will forward the refill request to us. Please allow 3 business days for your refill to be completed.          Additional Information About Your Visit        Care EveryWhere ID     This is your Care EveryWhere ID. This could be used by other organizations to access your [place] medical records  [id number]        Your Vitals Were     Pulse Temperature Pulse Oximetry BMI (Body Mass Index)          69 97.8  F (36.6  C) (Oral) 99% 25.17 kg/m2         Blood Pressure from Last 3 Encounters:   05/24/17 107/74   05/18/17 110/76   02/23/17 117/72    Weight from Last 3 Encounters:   05/24/17 137 lb 9.6 oz (62.4 kg)   05/18/17 134 lb (60.8 kg)   02/23/17 132 lb (59.9 kg)              Today, you had the following     No orders found for display       Primary Care Provider Office Phone # Fax #    Alida Martinez PA-C [phone]       [facility] [address] [facility] [address]        Thank you!     Thank you for choosing [facility]  for your care. Our goal is always to provide you with excellent care. Hearing back from our patients is one way we can continue to improve our services. Please take a few minutes to complete the written survey that you may receive in the mail after your visit with us. Thank you!             Your Updated Medication List - Protect others around you: Learn how to safely use, store and throw away your medicines at www.disposemymeds.org.          This list is accurate as of: 5/24/17 11:59 PM.  Always use your most recent med list.                   Brand Name Dispense Instructions for use    omeprazole 40 MG capsule    priLOSEC    30 capsule    Take 1 capsule (40 mg) by mouth daily Take 30-60 minutes before a meal.       polyethylene glycol 400 0.25 % Soln ophthalmic solution    BLINK TEARS     Place 1 drop into both eyes 2 times daily \"Blink Tears\"         "

## 2017-05-24 NOTE — NURSING NOTE
"Chief Complaint   Patient presents with     Flank Pain     Left side pain X2 weeks       Initial /74 (BP Location: Right arm, Patient Position: Chair, Cuff Size: Adult Regular)  Pulse 69  Temp 97.8  F (36.6  C) (Oral)  Wt 137 lb 9.6 oz (62.4 kg)  SpO2 99%  BMI 25.17 kg/m2 Estimated body mass index is 25.17 kg/(m^2) as calculated from the following:    Height as of 2/23/17: 5' 2\" (1.575 m).    Weight as of this encounter: 137 lb 9.6 oz (62.4 kg).  Medication Reconciliation: complete    Gretchen Joseph CMA  "

## 2017-06-01 NOTE — PROGRESS NOTES
S:  Paula Carney is a 41 year old female here for a colposcopy.  Her last pap smear:      3/10/16: Pap - ASCUS, + HR HPV. Plan colp  4/29/16: [facility] ECC - negative. Plan cotest in 1 year.   No abnormal discharge or pain with sex    Cervical risk factors: Tobacco Use no      Previous STD none      Previous dysplasia: no      Previous colposcopy: yes:  date 4/2016.       Previous treatment:  none     Current birth control: other pull out  No LMP recorded. Patient is not currently having periods (Reason: Irregular Periods).  I discussed the history of HPV and the risk of dysplasia/cancer.      Pt does not need colposcopy today. Needs diagnostic pap.    PMH/PSH/Meds/All were reviewed and updated at this visit.      O:  Vitals noted.  Patient alert, oriented, and in no acute distress. She was placed on the exam table in the dorsal position and a speculum inserted into the vagina. The cervix was easily visualized.    GYN: cx appears normal, nulliparous, vagina pink/well rugated, normal physiologic white discharge in vault.      (R87.610) Papanicolaou smear of cervix with atypical squamous cells of undetermined significance (ASC-US)  (primary encounter diagnosis)  Comment: due for f/u  Plan: diagnostic pap collected, pt warned she may need another colp based on results.  Will notify when available    (N91.2) Absence of menstruation  Comment: irregular periods x 4 months  Plan: Beta HCG qual IFA urine        Neg HCG today.  Pt desires gyn consult about periods.

## 2017-06-05 ENCOUNTER — OFFICE VISIT (OUTPATIENT)
Dept: FAMILY MEDICINE | Facility: CLINIC | Age: 42
End: 2017-06-05
Payer: COMMERCIAL

## 2017-06-05 VITALS
SYSTOLIC BLOOD PRESSURE: 114 MMHG | TEMPERATURE: 97 F | DIASTOLIC BLOOD PRESSURE: 76 MMHG | WEIGHT: 139 LBS | HEIGHT: 62 IN | BODY MASS INDEX: 25.58 KG/M2 | HEART RATE: 61 BPM

## 2017-06-05 DIAGNOSIS — N91.2 ABSENCE OF MENSTRUATION: ICD-10-CM

## 2017-06-05 DIAGNOSIS — R87.610 PAPANICOLAOU SMEAR OF CERVIX WITH ATYPICAL SQUAMOUS CELLS OF UNDETERMINED SIGNIFICANCE (ASC-US): Primary | ICD-10-CM

## 2017-06-05 LAB — BETA HCG QUAL IFA URINE: NEGATIVE

## 2017-06-05 PROCEDURE — 88175 CYTOPATH C/V AUTO FLUID REDO: CPT | Performed by: FAMILY MEDICINE

## 2017-06-05 PROCEDURE — 84703 CHORIONIC GONADOTROPIN ASSAY: CPT | Performed by: FAMILY MEDICINE

## 2017-06-05 PROCEDURE — 87624 HPV HI-RISK TYP POOLED RSLT: CPT | Performed by: FAMILY MEDICINE

## 2017-06-05 PROCEDURE — 99213 OFFICE O/P EST LOW 20 MIN: CPT | Performed by: FAMILY MEDICINE

## 2017-06-05 NOTE — LETTER
"                                                                          Affirmation of Informed Consent for Surgery or Invasive Procedure    1.  I, (print patient's name) Paula Carney,  1975,   a.  Agree that I will have (include both the medical term and patient words):           Chief Complaint   Patient presents with     Colposcopy      b.  At [facility].     c.  The reason for this procedure is (medical condition):  HR HPV .   d.  This will be done or supervised by:  Sheela Angulo MD.   e.  My doctor may have help from others.  Help could include opening or closing         the wound. Help might also include taking grafts, cutting out tissue,                           implanting devices.  I have been told who will help, if known.     2.  I have talked to my doctor or health care team about:   a.  What the procedure is and what will happen.   b   How it may help me (the benefits).   c.  How it may harm me (the most likely and most serious risks).   d.  The long-term effects the procedure might have.    e.  My other choices for treatment.  The risks and benefits of those choices.    f.   What will likely happen if I say \"no\" to this procedure.    g.  How I might feel right after and how quickly I might be expected to recover.      h.  What medicines will be used to manage pain or sedate me.     3.  I agree that:  (If I do not agree with a statement, I have crossed it out and              initialed next to it.)       a.  I will ask questions.     b.  No one has promised me definite results.    c.  If serious problems are found during the procedure, the treatment may                    change.   d.  If I have \"do not resuscitate\" (DNR) wishes, I have discussed this with my                              doctor and they will be put on hold during the procedure.   e. Students and other appropriate people, approved by the facility, may watch                      the procedure and help with " tasks they are qualified for.                                                    f.  Pictures or videos may be taken. They may be used for medical or                  educational reasons only.       g. Tissues or organs removed from my body as part of the normal course of the                    procedure may be used for research or teaching purposes. They will be                  disposed of with respect.                    h.  If a staff person is exposed to my blood or body fluids, my blood will be drawn                   and tested for HIV and hepatitis.  I understand that by law, the test results will                    go:         -  In my medical record.                         -  To the Employee Occupational Health Service and/or Infection Control                                  at this facility.    -  To the [place] [facility] officials.                 i.  I may have a blood transfusion: I have talked to my doctor or care team about:    -  Why I may need a blood transfusion.     - The risks, benefits, and side effects of transfusion - and the risks of not        Having one.     -  Blood safety and other options for treatment.        Consent for blood transfusion obtained during a hospital admission is valid for the entire hospital stay.  Consent  for blood transfusion obtained in the clinic setting is valid for a year from the signature date.                                4.  I understand that:   a.  I can change my mind.  If I do, I must tell my doctor or team as soon as                           possible.              b.  The team members may change during the procedure.                c.   The team will double-check who I am.  They will ask me what I am having                         done and the site of the site of the procedure.  This is done for my safety.    My questions have been answered.  I agree to the procedure.  I have made my special needs and instructions known.      Paula Lomeli  Angelika      6/5/2017 3:04 PM  Patient (or representative)/Relationship to patient             Date  Time    For telephone consent:      6/5/2017  3:04 PM         Date  Time  Print name of patient (or representative)/relationship to patient    Witness:  I have verified that the signature is that of the patient or patient's representative and that this has been signed before the procedure:    NAME:        6/5/2017  3:04 PM         Date  Time  Person verifying patient's name or patient representative's signature     Provider:  I have discussed the procedure and the information stated above with the patient (or the patient's representative) and answered their questions. The patient or their representative consented to the procedure:      Sheela Angulo MD    6/5/2017  3:04 PM  Physician or Provider's Signature(s)   Date  Time       Intepreter (if used):       6/5/2017  3:04 PM                                   Name       Language/Organization Date  Time    Consent for procedure valid for 30 days after patient signature date     [facility]  AFFIRMATION OF INFORMED CONSENT FOR SURGERY OR INVASIVE PROCEDURE               Original - Medical Records

## 2017-06-05 NOTE — MR AVS SNAPSHOT
After Visit Summary   6/5/2017    Paula Carney    MRN: [id number]           Patient Information     Date Of Birth          1975        Visit Information        Provider Department      6/5/2017 3:15 PM Sheela Angulo MD; [place] PROCEDURE ROOM 2 [facility]        Today's Diagnoses     Papanicolaou smear of cervix with atypical squamous cells of undetermined significance (ASC-US)    -  1    Absence of menstruation           Follow-ups after your visit        Your next 10 appointments already scheduled     Jun 12, 2017  3:50 PM CDT   Office Visit with LADONNA Barnett [facility] ([facility])    [address] [address] [phone]           Bring a current list of meds and any records pertaining to this visit.  For Physicals, please bring immunization records and any forms needing to be filled out.  Please arrive 10 minutes early to complete paperwork.              Who to contact     If you have questions or need follow up information about today's clinic visit or your schedule please contact [facility] directly at [phone].  Normal or non-critical lab and imaging results will be communicated to you by MyChart, letter or phone within 4 business days after the clinic has received the results. If you do not hear from us within 7 days, please contact the clinic through MyChart or phone. If you have a critical or abnormal lab result, we will notify you by phone as soon as possible.  Submit refill requests through [employer] or call your pharmacy and they will forward the refill request to us. Please allow 3 business days for your refill to be completed.          Additional Information About Your Visit        Care EveryWhere ID     This is your Care EveryWhere ID. This could be used by other organizations to access your [place] medical records  [id number]        Your Vitals Were     Pulse  "Temperature Height BMI (Body Mass Index)          61 97  F (36.1  C) (Oral) 5' 2\" (1.575 m) 25.42 kg/m2         Blood Pressure from Last 3 Encounters:   06/05/17 114/76   05/24/17 107/74   05/18/17 110/76    Weight from Last 3 Encounters:   06/05/17 139 lb (63 kg)   05/24/17 137 lb 9.6 oz (62.4 kg)   05/18/17 134 lb (60.8 kg)              We Performed the Following     Beta HCG qual IFA urine     HPV High Risk Types DNA Cervical     Pap imaged thin layer diagnostic with HPV (select HPV order below)        Primary Care Provider Office Phone # Fax #    Alida Martinez PA-C [phone]       [facility] [address] [address]        Thank you!     Thank you for choosing [facility]  for your care. Our goal is always to provide you with excellent care. Hearing back from our patients is one way we can continue to improve our services. Please take a few minutes to complete the written survey that you may receive in the mail after your visit with us. Thank you!             Your Updated Medication List - Protect others around you: Learn how to safely use, store and throw away your medicines at www.disposemymeds.org.          This list is accurate as of: 6/5/17  4:17 PM.  Always use your most recent med list.                   Brand Name Dispense Instructions for use    omeprazole 40 MG capsule    priLOSEC    30 capsule    Take 1 capsule (40 mg) by mouth daily Take 30-60 minutes before a meal.       polyethylene glycol 400 0.25 % Soln ophthalmic solution    BLINK TEARS     Place 1 drop into both eyes 2 times daily \"Blink Tears\"         "

## 2017-06-05 NOTE — NURSING NOTE
"Chief Complaint   Patient presents with     Colposcopy       Initial /76  Pulse 61  Temp 97  F (36.1  C) (Oral)  Ht 5' 2\" (1.575 m)  Wt 139 lb (63 kg)  BMI 25.42 kg/m2 Estimated body mass index is 25.42 kg/(m^2) as calculated from the following:    Height as of this encounter: 5' 2\" (1.575 m).    Weight as of this encounter: 139 lb (63 kg).  Medication Reconciliation: complete  Jessica Brown , CMA     "

## 2017-06-07 ENCOUNTER — TELEPHONE (OUTPATIENT)
Dept: FAMILY MEDICINE | Facility: CLINIC | Age: 42
End: 2017-06-07

## 2017-06-07 LAB
COPATH REPORT: NORMAL
PAP: NORMAL

## 2017-06-07 NOTE — TELEPHONE ENCOUNTER
Patient is calling for her pap results from 6/5/17 with Sheela Angulo.  Please call and advise.  Thank you.  Swetha Corona,

## 2017-06-08 LAB
FINAL DIAGNOSIS: ABNORMAL
HPV HR 12 DNA CVX QL NAA+PROBE: POSITIVE
HPV16 DNA SPEC QL NAA+PROBE: NEGATIVE
HPV18 DNA SPEC QL NAA+PROBE: NEGATIVE
SPECIMEN DESCRIPTION: ABNORMAL

## 2017-06-09 ENCOUNTER — TELEPHONE (OUTPATIENT)
Dept: OBGYN | Facility: CLINIC | Age: 42
End: 2017-06-09

## 2017-06-09 NOTE — TELEPHONE ENCOUNTER
Spoke with patient and advised of pap results  Transferred to [facility] to schedule.   Yue Babin RN, BSN  Pap Tracking

## 2017-06-12 ENCOUNTER — OFFICE VISIT (OUTPATIENT)
Dept: OBGYN | Facility: CLINIC | Age: 42
End: 2017-06-12
Payer: COMMERCIAL

## 2017-06-12 VITALS
BODY MASS INDEX: 25.06 KG/M2 | OXYGEN SATURATION: 100 % | DIASTOLIC BLOOD PRESSURE: 74 MMHG | WEIGHT: 137 LBS | SYSTOLIC BLOOD PRESSURE: 120 MMHG | HEART RATE: 62 BPM

## 2017-06-12 DIAGNOSIS — N95.1 PERIMENOPAUSAL SYMPTOMS: Primary | ICD-10-CM

## 2017-06-12 PROCEDURE — 99203 OFFICE O/P NEW LOW 30 MIN: CPT | Performed by: NURSE PRACTITIONER

## 2017-06-12 RX ORDER — LEVONORGESTREL/ETHIN.ESTRADIOL 0.1-0.02MG
1 TABLET ORAL DAILY
Qty: 84 TABLET | Refills: 3 | Status: SHIPPED | OUTPATIENT
Start: 2017-06-12 | End: 2018-01-10

## 2017-06-12 NOTE — PROGRESS NOTES
S: Paula is a 41 year old  0 presenting today with concerns of irregular menstrual cycles for the last 3-5 years. This happened back in  when patient was extremely limiting her diet choices. Once she started adding more calories back in, cycles returned. Had been normal until the last few years. Still getting cycles every few months. LMP was . Has gone up to 4 or 5 months at the most without any bleeding. With cycles, day 1 is heavier and bleeding last 3 days total.  has had vasectomy. Hormone testing was done 1 month ago and showed an elevated FSH, normal Estradiol, TSH, and other hormonal labs. Patient denies any symptoms of hot flashes, night sweats, vaginal dryness. She is not comfortable with the thought of her cycles being unpredictable and has been caught with bleeding unexpectedly. She had been on oral contraceptive pills in the past for dysmenorrhea and questions restarting this for regulating cycles at this time. Patient believes her mother went through menopause well into her 50s. Patient medical, surgical, social, and family history reviewed and updated at today's visit. ROS: 10 point ROS neg other than the symptoms noted above in the HPI.    O: This is a well appearing female in no acute distress. Answers questions and maintains eye contact appropriately. Vital signs noted.  RESPIRATORY: Clear to auscultation bilaterally.  CV: Regular rate and rhythm without murmur, gallop, rub  ABDOMEN: Soft, nontender, nondistended, normoactive bowel sounds. No hepatosplenomegaly. No guarding, rebounding, or rigidity.  Vulva: No external lesions, normal hair distribution, no adenopathy  BUS:  Normal, no masses noted  Vagina: Moist, pink, no abnormal discharge, well rugated, no lesions  Cervix: Pink, nulliparous, midline. Without cervical motion tenderness.  Uterus: Normal size and shape, non-tender, mobile  Ovaries: No masses, non-tender, mobile    A/P:  (N95.1) Perimenopausal symptoms   (primary encounter diagnosis)  Comment: We reviewed her lab results, FSH suggests perimenopause. We discussed that symptoms of irregular cycles can occur even at this age and can last for years prior to being menopausal. We discussed hormonal changes we see as menopause nears and how that can impact ovulation. Patient would like to try a contraception as she prefers for cycles to be predictable at this time. Would like to try for 1-2 years and likely would then discontinue. We discussed benefits vs disadvantages of hormonal contraception. Patient tolerated low dose oral contraceptive pill well previously, would like to restart on that. We discussed when to start the pill, taking it at the same time every day, possible side effects she may experience.  Patient did also think she was having a colposcopy today. Discussed that unfortunately, I do not perform this procedure. She does prefer to see Dr. Angulo and so she will call to schedule this appointment.  Plan: levonorgestrel-ethinyl estradiol         (AVIANE,ALESSE,LESSINA) 0.1-20 MG-MCG per         tablet          Shavonne DOUGHERTY CNP

## 2017-06-12 NOTE — MR AVS SNAPSHOT
After Visit Summary   6/12/2017    Paula Carney    MRN: [id number]           Patient Information     Date Of Birth          1975        Visit Information        Provider Department      6/12/2017 3:50 PM Shavonne Cash APRN CNP [facility]        Today's Diagnoses     Perimenopausal symptoms    -  1       Follow-ups after your visit        Who to contact     If you have questions or need follow up information about today's clinic visit or your schedule please contact [facility] directly at [phone].  Normal or non-critical lab and imaging results will be communicated to you by MyChart, letter or phone within 4 business days after the clinic has received the results. If you do not hear from us within 7 days, please contact the clinic through MyChart or phone. If you have a critical or abnormal lab result, we will notify you by phone as soon as possible.  Submit refill requests through [employer] or call your pharmacy and they will forward the refill request to us. Please allow 3 business days for your refill to be completed.          Additional Information About Your Visit        Care EveryWhere ID     This is your Care EveryWhere ID. This could be used by other organizations to access your [place] medical records  [id number]        Your Vitals Were     Pulse Last Period Pulse Oximetry BMI (Body Mass Index)          62 03/28/2017 100% 25.06 kg/m2         Blood Pressure from Last 3 Encounters:   06/12/17 120/74   06/05/17 114/76   05/24/17 107/74    Weight from Last 3 Encounters:   06/12/17 137 lb (62.1 kg)   06/05/17 139 lb (63 kg)   05/24/17 137 lb 9.6 oz (62.4 kg)              Today, you had the following     No orders found for display         Today's Medication Changes          These changes are accurate as of: 6/12/17  3:55 PM.  If you have any questions, ask your nurse or doctor.               Start taking these medicines.         "Dose/Directions    levonorgestrel-ethinyl estradiol 0.1-20 MG-MCG per tablet   Commonly known as:  AVIANEFAYEELESSINA   Used for:  Perimenopausal symptoms   Started by:  Shavonne Cash APRN CNP        Dose:  1 tablet   Take 1 tablet by mouth daily   Quantity:  84 tablet   Refills:  3            Where to get your medicines      These medications were sent to [id number] [address]  [address] [address]    Hours:  24-hours Phone:  [phone]     levonorgestrel-ethinyl estradiol 0.1-20 MG-MCG per tablet                Primary Care Provider Office Phone # Fax #    Alida Martinez PA-C [phone]       [facility] [address] [address] [facility] [address]        Thank you!     Thank you for choosing [facility]  for your care. Our goal is always to provide you with excellent care. Hearing back from our patients is one way we can continue to improve our services. Please take a few minutes to complete the written survey that you may receive in the mail after your visit with us. Thank you!             Your Updated Medication List - Protect others around you: Learn how to safely use, store and throw away your medicines at www.disposemymeds.org.          This list is accurate as of: 6/12/17  3:55 PM.  Always use your most recent med list.                   Brand Name Dispense Instructions for use    levonorgestrel-ethinyl estradiol 0.1-20 MG-MCG per tablet    AVIANE,FAYEE,LESSINA    84 tablet    Take 1 tablet by mouth daily       omeprazole 40 MG capsule    priLOSEC    30 capsule    Take 1 capsule (40 mg) by mouth daily Take 30-60 minutes before a meal.       polyethylene glycol 400 0.25 % Soln ophthalmic solution    BLINK TEARS     Place 1 drop into both eyes 2 times daily \"Blink Tears\"         "

## 2017-06-12 NOTE — NURSING NOTE
"Chief Complaint   Patient presents with     Amenorrhea     missed period, on and off for a few years. menopause?        Initial /74  Pulse 62  Wt 137 lb (62.1 kg)  LMP 03/28/2017  SpO2 100%  BMI 25.06 kg/m2 Estimated body mass index is 25.06 kg/(m^2) as calculated from the following:    Height as of 6/5/17: 5' 2\" (1.575 m).    Weight as of this encounter: 137 lb (62.1 kg).  Medication Reconciliation: complete    Elsie Archibald MA    "

## 2017-06-16 NOTE — TELEPHONE ENCOUNTER
Patient had appointment with Shavonne on 6/12/17.    The following was discussed.    Patient did also think she was having a colposcopy today. Discussed that unfortunately, I do not perform this procedure. She does prefer to see Dr. Angulo and so she will call to schedule this appointment.      Chapincito Velez [facility]

## 2017-11-12 ENCOUNTER — HEALTH MAINTENANCE LETTER (OUTPATIENT)
Age: 42
End: 2017-11-12

## 2018-01-10 ENCOUNTER — OFFICE VISIT (OUTPATIENT)
Dept: OBGYN | Facility: CLINIC | Age: 43
End: 2018-01-10
Payer: COMMERCIAL

## 2018-01-10 VITALS
WEIGHT: 131.4 LBS | DIASTOLIC BLOOD PRESSURE: 79 MMHG | HEART RATE: 61 BPM | OXYGEN SATURATION: 96 % | BODY MASS INDEX: 24.03 KG/M2 | SYSTOLIC BLOOD PRESSURE: 119 MMHG

## 2018-01-10 DIAGNOSIS — R87.810 CERVICAL HIGH RISK HPV (HUMAN PAPILLOMAVIRUS) TEST POSITIVE: Primary | ICD-10-CM

## 2018-01-10 DIAGNOSIS — N89.8 VAGINAL DISCHARGE: ICD-10-CM

## 2018-01-10 DIAGNOSIS — Z32.00 PREGNANCY EXAMINATION OR TEST, PREGNANCY UNCONFIRMED: ICD-10-CM

## 2018-01-10 LAB
BETA HCG QUAL IFA URINE: NEGATIVE
SPECIMEN SOURCE: ABNORMAL
WET PREP SPEC: ABNORMAL

## 2018-01-10 PROCEDURE — 57454 BX/CURETT OF CERVIX W/SCOPE: CPT | Performed by: OBSTETRICS & GYNECOLOGY

## 2018-01-10 PROCEDURE — 88305 TISSUE EXAM BY PATHOLOGIST: CPT | Performed by: OBSTETRICS & GYNECOLOGY

## 2018-01-10 PROCEDURE — 84703 CHORIONIC GONADOTROPIN ASSAY: CPT | Performed by: OBSTETRICS & GYNECOLOGY

## 2018-01-10 PROCEDURE — 87210 SMEAR WET MOUNT SALINE/INK: CPT | Performed by: OBSTETRICS & GYNECOLOGY

## 2018-01-10 PROCEDURE — 99213 OFFICE O/P EST LOW 20 MIN: CPT | Mod: 25 | Performed by: OBSTETRICS & GYNECOLOGY

## 2018-01-10 NOTE — MR AVS SNAPSHOT
After Visit Summary   1/10/2018    Paula Carney    MRN: [id number]           Patient Information     Date Of Birth          1975        Visit Information        Provider Department      1/10/2018 3:45 PM Kary Barbour DO; PROC RM 1 [facility]        Today's Diagnoses     Pregnancy examination or test, pregnancy unconfirmed    -  1      Care Instructions                                                         If you have any questions regarding your visit, Please contact your care team.    [facility] CLINIC HOURS TELEPHONE NUMBER   Kary Barbour DO.    WILLIE Galaviz -    INGRID Steen RN       [facility], Wednesday[facility]  8:30a.m-5:00 p.m   [facility]  [address] [place] [address]  [phone] ask for [facility]    Imaging Smgmuhdese-[phone]       Urgent Care locations:    [facility] Saturday and Sunday   9 am - 5 pm    Monday-Friday   12 pm - 8 pm  Saturday and Sunday   9 am - 5 pm   [phone]     [facility] Labor and Delivery:  [phone]    If you need a medication refill, please contact your pharmacy. Please allow 3 business days for your refill to be completed.  As always, Thank you for trusting us with your healthcare needs!                Follow-ups after your visit        Who to contact     If you have questions or need follow up information about today's clinic visit or your schedule please contact [facility] directly at [phone].  Normal or non-critical lab and imaging results will be communicated to you by MyChart, letter or phone within 4 business days after the clinic has received the results. If you do not hear from us within 7 days, please contact the clinic through MyChart or phone. If you have a critical or abnormal lab result, we will notify you by phone as  "soon as possible.  Submit refill requests through [employer] or call your pharmacy and they will forward the refill request to us. Please allow 3 business days for your refill to be completed.          Additional Information About Your Visit        [employer] Information     [employer] lets you send messages to your doctor, view your test results, renew your prescriptions, schedule appointments and more. To sign up, go to [email or web address] . Click on \"Log in\" on the left side of the screen, which will take you to the Welcome page. Then click on \"Sign up Now\" on the right side of the page.     You will be asked to enter the access code listed below, as well as some personal information. Please follow the directions to create your username and password.     Your access code is: [email or web address]  Expires: 4/10/2018  4:11 PM     Your access code will  in 90 days. If you need help or a new code, please call your [place] clinic or [phone].        Care EveryWhere ID     This is your Care EveryWhere ID. This could be used by other organizations to access your [place] medical records  [id number]        Your Vitals Were     Pulse Last Period Pulse Oximetry Breastfeeding? BMI (Body Mass Index)       61 2017 96% No 24.03 kg/m2        Blood Pressure from Last 3 Encounters:   01/10/18 119/79   17 120/74   17 114/76    Weight from Last 3 Encounters:   01/10/18 59.6 kg (131 lb 6.4 oz)   17 62.1 kg (137 lb)   17 63 kg (139 lb)              We Performed the Following     Beta HCG qual IFA urine          Today's Medication Changes          These changes are accurate as of: 1/10/18  4:11 PM.  If you have any questions, ask your nurse or doctor.               Stop taking these medicines if you haven't already. Please contact your care team if you have questions.     levonorgestrel-ethinyl estradiol 0.1-20 MG-MCG per tablet   Commonly known as:  AVIANE,OTIS,LESSINA   Stopped by:  Kary Barbour " "DO Rico           omeprazole 40 MG capsule   Commonly known as:  priLOSEC   Stopped by:  Kary Barbour DO                    Primary Care Provider Office Phone # Fax #    Alida Martinez PA-C [phone] [address] [facility] [address]        Equal Access to Services     [facility]: Hadii aad ku hadasho Soomaali, waaxda luqadaha, qaybta kaalmada adeegyada, waxay idiin hayaan adeeg kharash la'aan . So [facility] [phone].    ATENCIÓN: Si habla español, tiene a sewell disposición servicios gratuitos de asistencia lingüística. Llame al [phone].    We comply with applicable federal civil rights laws and [place] laws. We do not discriminate on the basis of race, color, national origin, age, disability, sex, sexual orientation, or gender identity.            Thank you!     Thank you for choosing [facility]  for your care. Our goal is always to provide you with excellent care. Hearing back from our patients is one way we can continue to improve our services. Please take a few minutes to complete the written survey that you may receive in the mail after your visit with us. Thank you!             Your Updated Medication List - Protect others around you: Learn how to safely use, store and throw away your medicines at www.disposemymeds.org.          This list is accurate as of: 1/10/18  4:11 PM.  Always use your most recent med list.                   Brand Name Dispense Instructions for use Diagnosis    polyethylene glycol 400 0.25 % Soln ophthalmic solution    BLINK TEARS     Place 1 drop into both eyes 2 times daily \"Blink Tears\"    Dry eyes, bilateral         "

## 2018-01-10 NOTE — PATIENT INSTRUCTIONS
If you have any questions regarding your visit, Please contact your care team.    [facility] CLINIC HOURS TELEPHONE NUMBER   Kary DO Angle.    WILLIE Galaviz -    NIGRID Steen RN       Monday, Wednesday, Thursday and Friday, [place]  8:30a.m-5:00 p.m   [facility]  [address] [place] [address]  [phone] [facility] for [facility]    Imaging Lizkncqbao-[phone]       Urgent Care locations:    [facility] Saturday and Sunday   9 am - 5 pm    Monday-Friday   12 pm - 8 pm  Saturday and Sunday   9 am - 5 pm   [phone]     [facility] Labor and Delivery:  [phone]    If you need a medication refill, please contact your pharmacy. Please allow 3 business days for your refill to be completed.  As always, Thank you for trusting us with your healthcare needs!

## 2018-01-11 DIAGNOSIS — B37.31 YEAST INFECTION OF THE VAGINA: Primary | ICD-10-CM

## 2018-01-11 RX ORDER — FLUCONAZOLE 150 MG/1
150 TABLET ORAL ONCE
Qty: 1 TABLET | Refills: 0 | Status: SHIPPED | OUTPATIENT
Start: 2018-01-11 | End: 2018-01-11

## 2018-01-11 NOTE — PROGRESS NOTES
"This 41 y/o female, , LMP 17, presents for colposcopy as a f/u to an abnormal DNA reflex test on 27 which was positive for \"other\" high risk HPV subtypes.  Her pap was normal but was suspicious for BV so a wet prep will be obtained today.  She denies any vaginal symptoms.  She denies any risk of pregnancy.  /79  Pulse 61  Wt 59.6 kg (131 lb 6.4 oz)  LMP 2017  SpO2 96%  Breastfeeding? No  BMI 24.03 kg/m2  Informed consent was reviewed and obtained for colposcopy with biopsies.  Her pre-procedural UPT was negative.  A speculum was placed and a wet prep was obtained and submitted.  There was a small amount of white vaginal discharge present but no lesions or growths were noted.  Dilute acetic acid was then placed and after several minutes it was removed.  There were no discolorations of the cervix so ectocervical biopsies were not performed.  However, due to cervical stenosis, a single-toothed tenaculum was used to grasp the 12 o'clock position of her cervix and the ECC was obtained and submitted.  She tolerated the procedure well and all instruments were removed.  This was a satisfactory colposcopy and f/u care and directions were reviewed.    Assessment - colposcopy for evaluation of \"other\" high risk HPV subtypes  Plan - submit ECC and wet prep and treat if abnormal.  All counts were correct and there were no complications.  This was a 30-minute visit and over 50% of the time was spent in direct pt consultation and 10 minutes of this time was spent in procedure.  "

## 2018-01-12 LAB — COPATH REPORT: NORMAL

## 2018-08-06 ENCOUNTER — HEALTH MAINTENANCE LETTER (OUTPATIENT)
Age: 43
End: 2018-08-06

## 2019-01-18 ENCOUNTER — TELEPHONE (OUTPATIENT)
Dept: FAMILY MEDICINE | Facility: CLINIC | Age: 44
End: 2019-01-18

## 2019-01-18 NOTE — TELEPHONE ENCOUNTER
Pt is past due for Pap follow up  Reminder letter has been sent  LMTC her clinic with any questions or to schedule    Celsa Telles,   Pap Tracking

## 2019-11-26 ENCOUNTER — TELEPHONE (OUTPATIENT)
Dept: OBGYN | Facility: CLINIC | Age: 44
End: 2019-11-26

## 2019-11-26 NOTE — TELEPHONE ENCOUNTER
[facility] Call Center    Phone Message    May a detailed message be left on voicemail: yes    Reason for Call: Other: Patient called to schedule colposcopy, but call dropped. Please call to schedule.      Action Taken: Message routed to:  [facility] p [phone]

## 2019-11-27 NOTE — TELEPHONE ENCOUNTER
Left message on machine to call back  Anastacia Stuart, [facility]  November 27, 2019 3:53 PM

## 2019-11-29 NOTE — TELEPHONE ENCOUNTER
Called the listed number and states number is no longer in service.  Letter regarding COLP scheduling sent.  Sanjuanita Joyner M.A.

## 2019-12-02 NOTE — TELEPHONE ENCOUNTER
Called and spoke with PT and tried to explain to PT that her last pap was in 2017, and that she had an ECC in 2018 that was normal, so we would not be doing a colposcopy on her. PT states that she is concerned for her health and a pap does not get as much information as a colposcopy.  I tried to inform PT that a knew pap needs to be done first before we can do a colposcopy, but PT states that we sent a letter saying that a colposcopy was needed, so I apologized for the letter that it was just a response to the PT request. Pt has agreed to repeat her PAP.  Liana More, CMA

## 2021-07-26 NOTE — TELEPHONE ENCOUNTER
Message left to return call to [phone].    NIL Pap result. Waiting for HPV.    Patient is requesting a call back from clinical staff to discuss a few follow-up questions she has in regards to her office visit on 7/12/2021.

## 2023-04-09 ENCOUNTER — HOSPITAL ENCOUNTER (EMERGENCY)
Facility: CLINIC | Age: 48
Discharge: HOME | End: 2023-04-09
Payer: COMMERCIAL

## 2023-04-09 VITALS
WEIGHT: 125 LBS | TEMPERATURE: 98.7 F | HEIGHT: 60 IN | SYSTOLIC BLOOD PRESSURE: 125 MMHG | RESPIRATION RATE: 20 BRPM | DIASTOLIC BLOOD PRESSURE: 75 MMHG | HEART RATE: 80 BPM | OXYGEN SATURATION: 100 % | BODY MASS INDEX: 24.54 KG/M2